# Patient Record
Sex: MALE | Race: WHITE
[De-identification: names, ages, dates, MRNs, and addresses within clinical notes are randomized per-mention and may not be internally consistent; named-entity substitution may affect disease eponyms.]

---

## 2019-02-16 ENCOUNTER — HOSPITAL ENCOUNTER (EMERGENCY)
Dept: HOSPITAL 61 - ER | Age: 64
Discharge: HOME | End: 2019-02-16
Payer: OTHER GOVERNMENT

## 2019-02-16 VITALS — SYSTOLIC BLOOD PRESSURE: 151 MMHG | DIASTOLIC BLOOD PRESSURE: 82 MMHG

## 2019-02-16 VITALS — BODY MASS INDEX: 27.4 KG/M2 | WEIGHT: 185 LBS | HEIGHT: 69 IN

## 2019-02-16 DIAGNOSIS — N23: Primary | ICD-10-CM

## 2019-02-16 DIAGNOSIS — Z98.890: ICD-10-CM

## 2019-02-16 DIAGNOSIS — E78.00: ICD-10-CM

## 2019-02-16 LAB
ALBUMIN SERPL-MCNC: 4 G/DL (ref 3.4–5)
ALBUMIN/GLOB SERPL: 1.2 {RATIO} (ref 1–1.7)
ALP SERPL-CCNC: 90 U/L (ref 46–116)
ALT SERPL-CCNC: 51 U/L (ref 16–63)
ANION GAP SERPL CALC-SCNC: 8 MMOL/L (ref 6–14)
APTT PPP: (no result) S
AST SERPL-CCNC: 22 U/L (ref 15–37)
BACTERIA #/AREA URNS HPF: (no result) /HPF
BASOPHILS # BLD AUTO: 0 X10^3/UL (ref 0–0.2)
BASOPHILS NFR BLD: 1 % (ref 0–3)
BILIRUB SERPL-MCNC: 0.7 MG/DL (ref 0.2–1)
BILIRUB UR QL STRIP: NEGATIVE
BUN SERPL-MCNC: 16 MG/DL (ref 8–26)
BUN/CREAT SERPL: 15 (ref 6–20)
CALCIUM SERPL-MCNC: 9.2 MG/DL (ref 8.5–10.1)
CHLORIDE SERPL-SCNC: 105 MMOL/L (ref 98–107)
CO2 SERPL-SCNC: 29 MMOL/L (ref 21–32)
CREAT SERPL-MCNC: 1.1 MG/DL (ref 0.7–1.3)
EOSINOPHIL NFR BLD: 0.1 X10^3/UL (ref 0–0.7)
EOSINOPHIL NFR BLD: 2 % (ref 0–3)
ERYTHROCYTE [DISTWIDTH] IN BLOOD BY AUTOMATED COUNT: 12.6 % (ref 11.5–14.5)
FIBRINOGEN PPP-MCNC: (no result) MG/DL
GFR SERPLBLD BASED ON 1.73 SQ M-ARVRAT: 68.1 ML/MIN
GLOBULIN SER-MCNC: 3.4 G/DL (ref 2.2–3.8)
GLUCOSE SERPL-MCNC: 92 MG/DL (ref 70–99)
HCT VFR BLD CALC: 45.1 % (ref 39–53)
HGB BLD-MCNC: 15.1 G/DL (ref 13–17.5)
LYMPHOCYTES # BLD: 2.4 X10^3/UL (ref 1–4.8)
LYMPHOCYTES NFR BLD AUTO: 41 % (ref 24–48)
MCH RBC QN AUTO: 29 PG (ref 25–35)
MCHC RBC AUTO-ENTMCNC: 34 G/DL (ref 31–37)
MCV RBC AUTO: 88 FL (ref 79–100)
MONO #: 0.5 X10^3/UL (ref 0–1.1)
MONOCYTES NFR BLD: 8 % (ref 0–9)
NEUT #: 2.8 X10^3UL (ref 1.8–7.7)
NEUTROPHILS NFR BLD AUTO: 48 % (ref 31–73)
NITRITE UR QL STRIP: NEGATIVE
PH UR STRIP: 5 [PH]
PLATELET # BLD AUTO: 228 X10^3/UL (ref 140–400)
POTASSIUM SERPL-SCNC: 3.7 MMOL/L (ref 3.5–5.1)
PROT SERPL-MCNC: 7.4 G/DL (ref 6.4–8.2)
PROT UR STRIP-MCNC: 30 MG/DL
RBC # BLD AUTO: 5.15 X10^6/UL (ref 4.3–5.7)
RBC #/AREA URNS HPF: (no result) /HPF (ref 0–2)
SODIUM SERPL-SCNC: 142 MMOL/L (ref 136–145)
SQUAMOUS #/AREA URNS LPF: (no result) /LPF
UROBILINOGEN UR-MCNC: 0.2 MG/DL
WBC # BLD AUTO: 5.8 X10^3/UL (ref 4–11)
WBC #/AREA URNS HPF: (no result) /HPF (ref 0–4)

## 2019-02-16 PROCEDURE — 81001 URINALYSIS AUTO W/SCOPE: CPT

## 2019-02-16 PROCEDURE — 87086 URINE CULTURE/COLONY COUNT: CPT

## 2019-02-16 PROCEDURE — 74176 CT ABD & PELVIS W/O CONTRAST: CPT

## 2019-02-16 PROCEDURE — 99284 EMERGENCY DEPT VISIT MOD MDM: CPT

## 2019-02-16 PROCEDURE — 96374 THER/PROPH/DIAG INJ IV PUSH: CPT

## 2019-02-16 PROCEDURE — 36415 COLL VENOUS BLD VENIPUNCTURE: CPT

## 2019-02-16 PROCEDURE — 80053 COMPREHEN METABOLIC PANEL: CPT

## 2019-02-16 PROCEDURE — 85025 COMPLETE CBC W/AUTO DIFF WBC: CPT

## 2019-02-16 NOTE — PHYS DOC
Past Medical History


Past Medical History:  High Cholesterol


Additional Past Surgical Histo:  HERNIA


Alcohol Use:  None


Drug Use:  None





Adult General


Chief Complaint


Chief Complaint:  FLANK PAIN





HPI


HPI





Patient is a 63  year old m p/w left flank pain radiates to testicle


onset 2 am


similar to prior kidney stone sharp but not as severe as previous





Review of Systems


Review of Systems





Constitutional: Denies fever or chills []


Eyes: Denies change in visual acuity, redness, or eye pain []


HENT: Denies nasal congestion or sore throat []





[]


Integument: Denies rash or skin lesions []


Neurologic: Denies headache, focal weakness or sensory changes []


Endocrine: Denies polyuria or polydipsia []





All other systems were reviewed and found to be within normal limits, except as 

documented in this note.





Current Medications


Current Medications





Current Medications








 Medications


  (Trade)  Dose


 Ordered  Sig/Jericho  Start Time


 Stop Time Status Last Admin


Dose Admin


 


 Ketorolac


 Tromethamine


  (Toradol 15mg


 Vial)  15 mg  STK-MED ONCE  2/16/19 03:49


 2/16/19 03:50 DC  





 


 Sodium Chloride  1,000 ml @ 


 1,000 mls/hr  1X  ONCE  2/16/19 04:00


 2/16/19 04:59 DC 2/16/19 03:50


1,000 MLS/HR











Allergies


Allergies





Allergies








Coded Allergies Type Severity Reaction Last Updated Verified


 


  No Known Drug Allergies    2/16/19 No











Physical Exam


Physical Exam





Constitutional: Well developed, well nourished, no acute distress, non-toxic 

appearance. []


HENT: Normocephalic, atraumatic, bilateral external ears normal, oropharynx 

moist, no oral exudates, nose normal. []


Eyes: PERRLA, EOMI, conjunctiva normal, no discharge. [] 


Neck: Normal range of motion, no tenderness, supple, no stridor. [] 


Lungs & Thorax:  Bilateral breath sounds clear to auscultation []


Abdomen: Bowel sounds normal, soft, no tenderness, no masses, no pulsatile 

masses. [] 


Skin: Warm, dry, no erythema, no rash. [] 


Back: left cva ttp


Extremities: No tenderness, no cyanosis, no clubbing, ROM intact, no edema. [] 


Neurologic: Alert and oriented X 3, normal motor function, normal sensory 

function, no focal deficits noted. []


Psychologic: Affect normal, judgement normal, mood normal. []





Current Patient Data


Vital Signs





 Vital Signs








  Date Time  Temp Pulse Resp B/P (MAP) Pulse Ox O2 Delivery O2 Flow Rate FiO2


 


2/16/19 04:59  84 20 151/82 (105) 97 Room Air  


 


2/16/19 03:40 98.1       





 98.1       








Lab Values





 Laboratory Tests








Test


 2/16/19


03:32 2/16/19


03:35


 


Urine Collection Type Unknown   


 


Urine Color Deborah   


 


Urine Clarity Cloudy   


 


Urine pH 5.0   


 


Urine Specific Gravity 1.020   


 


Urine Protein


 30 mg/dL


(NEG-TRACE) 





 


Urine Glucose (UA)


 Negative mg/dL


(NEG) 





 


Urine Ketones (Stick)


 Trace mg/dL


(NEG) 





 


Urine Blood Large (NEG)   


 


Urine Nitrite


 Negative (NEG)


 





 


Urine Bilirubin


 Negative (NEG)


 





 


Urine Urobilinogen Dipstick


 0.2 mg/dL (0.2


mg/dL) 





 


Urine Leukocyte Esterase Small (NEG)   


 


Urine RBC


 Tntc /HPF


(0-2) 





 


Urine WBC


 1-4 /HPF (0-4)


 





 


Urine Squamous Epithelial


Cells Occ /LPF  


 





 


Urine Bacteria


 Few /HPF


(0-FEW) 





 


Urine Mucus Mod /LPF   


 


White Blood Count


 


 5.8 x10^3/uL


(4.0-11.0)


 


Red Blood Count


 


 5.15 x10^6/uL


(4.30-5.70)


 


Hemoglobin


 


 15.1 g/dL


(13.0-17.5)


 


Hematocrit


 


 45.1 %


(39.0-53.0)


 


Mean Corpuscular Volume


 


 88 fL ()





 


Mean Corpuscular Hemoglobin  29 pg (25-35)  


 


Mean Corpuscular Hemoglobin


Concent 


 34 g/dL


(31-37)


 


Red Cell Distribution Width


 


 12.6 %


(11.5-14.5)


 


Platelet Count


 


 228 x10^3/uL


(140-400)


 


Neutrophils (%) (Auto)  48 % (31-73)  


 


Lymphocytes (%) (Auto)  41 % (24-48)  


 


Monocytes (%) (Auto)  8 % (0-9)  


 


Eosinophils (%) (Auto)  2 % (0-3)  


 


Basophils (%) (Auto)  1 % (0-3)  


 


Neutrophils # (Auto)


 


 2.8 x10^3uL


(1.8-7.7)


 


Lymphocytes # (Auto)


 


 2.4 x10^3/uL


(1.0-4.8)


 


Monocytes # (Auto)


 


 0.5 x10^3/uL


(0.0-1.1)


 


Eosinophils # (Auto)


 


 0.1 x10^3/uL


(0.0-0.7)


 


Basophils # (Auto)


 


 0.0 x10^3/uL


(0.0-0.2)


 


Sodium Level


 


 142 mmol/L


(136-145)


 


Potassium Level


 


 3.7 mmol/L


(3.5-5.1)


 


Chloride Level


 


 105 mmol/L


()


 


Carbon Dioxide Level


 


 29 mmol/L


(21-32)


 


Anion Gap  8 (6-14)  


 


Blood Urea Nitrogen


 


 16 mg/dL


(8-26)


 


Creatinine


 


 1.1 mg/dL


(0.7-1.3)


 


Estimated GFR


(Cockcroft-Gault) 


 68.1  





 


BUN/Creatinine Ratio  15 (6-20)  


 


Glucose Level


 


 92 mg/dL


(70-99)


 


Calcium Level


 


 9.2 mg/dL


(8.5-10.1)


 


Total Bilirubin


 


 0.7 mg/dL


(0.2-1.0)


 


Aspartate Amino Transferase


(AST) 


 22 U/L (15-37)





 


Alanine Aminotransferase (ALT)


 


 51 U/L (16-63)





 


Alkaline Phosphatase


 


 90 U/L


()


 


Total Protein


 


 7.4 g/dL


(6.4-8.2)


 


Albumin


 


 4.0 g/dL


(3.4-5.0)


 


Albumin/Globulin Ratio  1.2 (1.0-1.7)  





 Laboratory Tests


2/16/19 03:35








 Laboratory Tests


2/16/19 03:35














EKG


EKG


[]





Radiology/Procedures


Radiology/Procedures


[]


Impressions:


5 MM STONE UVJ





Course & Med Decision Making


Course & Med Decision Making


Pertinent Labs and Imaging studies reviewed. (See chart for details)


LABS AND U/A LOOK GOOD


PAIN RESOLVED AFTER TORADOL


USUAL PREC GIVEN


F/U UROLOGY IF PAIN PERSISTS.





[]





Dragon Disclaimer


Dragon Disclaimer


This electronic medical record was generated, in whole or in part, using a 

voice recognition dictation system.





Departure


Departure


Impression:  


 Primary Impression:  


 Renal colic


Disposition:  01 HOME, SELF-CARE


Condition:  STABLE


Referrals:  


NO PCP (PCP)








RASHIDA LUDWIG MD


Patient Instructions:  Kidney Stones, Easy-to-Read


Scripts


Hydrocodone/Apap 5-325 (NORCO 5-325 TABLET) 1 Each Tablet


1-2 EACH PO PRN Q6HRS PRN for PAIN, #15


   as needed for pain


   Prov: MARIEL PARRIHS MD         2/16/19 


Tamsulosin Hcl (FLOMAX) 0.4 Mg Cap.er.24h


1 CAP PO DAILY, #10 CAP 11 Refills


   Prov: MARIEL PARRISH MD         2/16/19











MARIEL PARRISH MD Feb 16, 2019 04:25

## 2021-11-15 NOTE — RAD
Abdominal and Pelvis CT, Without Contrast:

 

History: Left flank pain.

 

Comparison: None.

 

Procedure: Axial images are obtained of the abdomen and pelvis, without IV

or oral contrast.

 

CT Abdomen without Contrast:

 

Findings:

Evaluation of solid organs is limited without contrast. Evaluation of 

stomach and bowel is limited without oral contrast.

 

Liver: Normal.

 

Spleen: Normal.

 

Pancreas: Normal.

 

 

Adrenal Glands: Normal.

 

Kidneys: There is mild left hydronephrosis and left hydroureter. There is 

a small cyst in the mid right kidney.

 

 There are multiple small stones in the gallbladder but no distention wall

thickening or surrounding inflammation.

 

There is no free air or free fluid.

 

There is no lymphadenopathy.

 

Impression:

 

Please see CT Pelvis without Contrast.

 

End Impression.

 

CT Pelvis without Contrast:

 

Findings:

 

There is a 5 mm stone in the left UVJ.

 

The appendix is not well seen but appears normal.

 

 

 

There is no free fluid.  There is no lymphadenopathy.

 

There is no pericolonic inflammation identified.

 

Impression:

 

 

5 mm stone left UVJ with mild left hydroureter and left hydronephrosis.

 

End impression

 

PQRS Compliance Statement:

 

One or more of the following individualized dose reduction techniques were

utilized for this examination:  

1. Automated exposure control  

2. Adjustment of the mA and/or kV according to patient size  

3. Use of iterative reconstruction technique

 

Electronically signed by: Shadi Wilson III, MD (2/16/2019 4:26 AM) [id number] Detail Level: Detailed Quality 431: Preventive Care And Screening: Unhealthy Alcohol Use - Screening: Patient identified as an unhealthy alcohol user when screened for unhealthy alcohol use using a systematic screening method and received brief counseling Quality 226: Preventive Care And Screening: Tobacco Use: Screening And Cessation Intervention: Patient screened for tobacco use and is an ex/non-smoker